# Patient Record
Sex: FEMALE | Race: BLACK OR AFRICAN AMERICAN | NOT HISPANIC OR LATINO | Employment: FULL TIME | URBAN - METROPOLITAN AREA
[De-identification: names, ages, dates, MRNs, and addresses within clinical notes are randomized per-mention and may not be internally consistent; named-entity substitution may affect disease eponyms.]

---

## 2017-06-05 LAB — POC BETA-HCG (QUAL): NEGATIVE

## 2017-06-06 ENCOUNTER — HISTORICAL (OUTPATIENT)
Dept: LAB | Facility: HOSPITAL | Age: 27
End: 2017-06-06

## 2017-08-31 LAB — POC BETA-HCG (QUAL): NEGATIVE

## 2017-11-30 LAB — POC BETA-HCG (QUAL): NEGATIVE

## 2018-02-15 ENCOUNTER — HISTORICAL (OUTPATIENT)
Dept: LAB | Facility: HOSPITAL | Age: 28
End: 2018-02-15

## 2018-02-15 LAB — POC BETA-HCG (QUAL): NEGATIVE

## 2018-04-19 ENCOUNTER — HISTORICAL (OUTPATIENT)
Dept: ADMINISTRATIVE | Facility: HOSPITAL | Age: 28
End: 2018-04-19

## 2018-04-19 LAB
ABS NEUT (OLG): 3.26 X10(3)/MCL (ref 2.1–9.2)
ALBUMIN SERPL-MCNC: 4.2 GM/DL (ref 3.4–5)
ALBUMIN/GLOB SERPL: 1 RATIO (ref 1–2)
ALP SERPL-CCNC: 56 UNIT/L (ref 45–117)
ALT SERPL-CCNC: 26 UNIT/L (ref 12–78)
AST SERPL-CCNC: 14 UNIT/L (ref 15–37)
BASOPHILS # BLD AUTO: 0.03 X10(3)/MCL
BASOPHILS NFR BLD AUTO: 0 %
BILIRUB SERPL-MCNC: 0.6 MG/DL (ref 0.2–1)
BILIRUBIN DIRECT+TOT PNL SERPL-MCNC: 0.2 MG/DL
BILIRUBIN DIRECT+TOT PNL SERPL-MCNC: 0.4 MG/DL
BUN SERPL-MCNC: 13 MG/DL (ref 7–18)
CALCIUM SERPL-MCNC: 8.7 MG/DL (ref 8.5–10.1)
CHLORIDE SERPL-SCNC: 108 MMOL/L (ref 98–107)
CO2 SERPL-SCNC: 27 MMOL/L (ref 21–32)
CREAT SERPL-MCNC: 0.7 MG/DL (ref 0.6–1.3)
DEPRECATED CALCIDIOL+CALCIFEROL SERPL-MC: 11.62 NG/ML (ref 30–80)
EOSINOPHIL # BLD AUTO: 0.02 10*3/UL
EOSINOPHIL NFR BLD AUTO: 0 %
ERYTHROCYTE [DISTWIDTH] IN BLOOD BY AUTOMATED COUNT: 13 % (ref 11.5–14.5)
GLOBULIN SER-MCNC: 3.9 GM/ML (ref 2.3–3.5)
GLUCOSE SERPL-MCNC: 82 MG/DL (ref 74–106)
HCT VFR BLD AUTO: 40.9 % (ref 35–46)
HGB BLD-MCNC: 13.3 GM/DL (ref 12–16)
IMM GRANULOCYTES # BLD AUTO: 0.01 10*3/UL
IMM GRANULOCYTES NFR BLD AUTO: 0 %
LYMPHOCYTES # BLD AUTO: 1.91 X10(3)/MCL
LYMPHOCYTES NFR BLD AUTO: 33 % (ref 13–40)
MCH RBC QN AUTO: 28.3 PG (ref 26–34)
MCHC RBC AUTO-ENTMCNC: 32.5 GM/DL (ref 31–37)
MCV RBC AUTO: 87 FL (ref 80–100)
MONOCYTES # BLD AUTO: 0.5 X10(3)/MCL
MONOCYTES NFR BLD AUTO: 9 % (ref 4–12)
NEUTROPHILS # BLD AUTO: 3.26 X10(3)/MCL
NEUTROPHILS NFR BLD AUTO: 57 X10(3)/MCL
PLATELET # BLD AUTO: 268 X10(3)/MCL (ref 130–400)
PMV BLD AUTO: 9.2 FL (ref 7.4–10.4)
POTASSIUM SERPL-SCNC: 3.5 MMOL/L (ref 3.5–5.1)
PROT SERPL-MCNC: 8.1 GM/DL (ref 6.4–8.2)
RBC # BLD AUTO: 4.7 X10(6)/MCL (ref 4–5.2)
SODIUM SERPL-SCNC: 139 MMOL/L (ref 136–145)
TSH SERPL-ACNC: 1.28 MIU/L (ref 0.36–3.74)
WBC # SPEC AUTO: 5.7 X10(3)/MCL (ref 4.5–11)

## 2018-05-10 LAB — POC BETA-HCG (QUAL): NEGATIVE

## 2018-08-14 LAB — POC BETA-HCG (QUAL): NEGATIVE

## 2018-11-08 LAB — POC BETA-HCG (QUAL): NEGATIVE

## 2019-02-07 LAB — POC BETA-HCG (QUAL): NEGATIVE

## 2019-03-21 ENCOUNTER — HISTORICAL (OUTPATIENT)
Dept: ADMINISTRATIVE | Facility: HOSPITAL | Age: 29
End: 2019-03-21

## 2019-03-21 LAB — DEPRECATED CALCIDIOL+CALCIFEROL SERPL-MC: 16.18 NG/ML (ref 30–80)

## 2019-05-09 LAB — POC BETA-HCG (QUAL): NEGATIVE

## 2019-12-06 ENCOUNTER — HISTORICAL (OUTPATIENT)
Dept: ADMINISTRATIVE | Facility: HOSPITAL | Age: 29
End: 2019-12-06

## 2019-12-09 LAB — FINAL CULTURE: NORMAL

## 2020-03-19 LAB — POC BETA-HCG (QUAL): NEGATIVE

## 2020-03-20 ENCOUNTER — HISTORICAL (OUTPATIENT)
Dept: LAB | Facility: HOSPITAL | Age: 30
End: 2020-03-20

## 2020-05-18 ENCOUNTER — HISTORICAL (OUTPATIENT)
Dept: ADMINISTRATIVE | Facility: HOSPITAL | Age: 30
End: 2020-05-18

## 2020-05-18 LAB
ABS NEUT (OLG): 4.25 X10(3)/MCL (ref 2.1–9.2)
ALBUMIN SERPL-MCNC: 3.8 GM/DL (ref 3.4–5)
ALBUMIN/GLOB SERPL: 1 RATIO (ref 1.1–2)
ALP SERPL-CCNC: 55 UNIT/L (ref 45–117)
ALT SERPL-CCNC: 22 UNIT/L (ref 12–78)
AST SERPL-CCNC: 14 UNIT/L (ref 15–37)
BASOPHILS # BLD AUTO: 0 X10(3)/MCL (ref 0–0.2)
BASOPHILS NFR BLD AUTO: 0 %
BILIRUB SERPL-MCNC: 0.3 MG/DL (ref 0.2–1)
BILIRUBIN DIRECT+TOT PNL SERPL-MCNC: 0.1 MG/DL (ref 0–0.2)
BILIRUBIN DIRECT+TOT PNL SERPL-MCNC: 0.2 MG/DL
BUN SERPL-MCNC: 13 MG/DL (ref 7–18)
CALCIUM SERPL-MCNC: 8.8 MG/DL (ref 8.5–10.1)
CHLORIDE SERPL-SCNC: 107 MMOL/L (ref 98–107)
CO2 SERPL-SCNC: 28 MMOL/L (ref 21–32)
CREAT SERPL-MCNC: 0.7 MG/DL (ref 0.6–1.3)
DEPRECATED CALCIDIOL+CALCIFEROL SERPL-MC: 21.1 NG/ML (ref 30–80)
EOSINOPHIL # BLD AUTO: 0 X10(3)/MCL (ref 0–0.9)
EOSINOPHIL NFR BLD AUTO: 0 %
ERYTHROCYTE [DISTWIDTH] IN BLOOD BY AUTOMATED COUNT: 12.4 % (ref 11.5–14.5)
GLOBULIN SER-MCNC: 4 GM/ML (ref 2.3–3.5)
GLUCOSE SERPL-MCNC: 96 MG/DL (ref 74–106)
HCT VFR BLD AUTO: 40.5 % (ref 35–46)
HGB BLD-MCNC: 12.9 GM/DL (ref 12–16)
IMM GRANULOCYTES # BLD AUTO: 0.02 10*3/UL
IMM GRANULOCYTES NFR BLD AUTO: 0 %
LYMPHOCYTES # BLD AUTO: 1.6 X10(3)/MCL (ref 0.6–4.6)
LYMPHOCYTES NFR BLD AUTO: 25 %
MCH RBC QN AUTO: 27.7 PG (ref 26–34)
MCHC RBC AUTO-ENTMCNC: 31.9 GM/DL (ref 31–37)
MCV RBC AUTO: 86.9 FL (ref 80–100)
MONOCYTES # BLD AUTO: 0.5 X10(3)/MCL (ref 0.1–1.3)
MONOCYTES NFR BLD AUTO: 8 %
NEUTROPHILS # BLD AUTO: 4.25 X10(3)/MCL (ref 2.1–9.2)
NEUTROPHILS NFR BLD AUTO: 66 %
PLATELET # BLD AUTO: 236 X10(3)/MCL (ref 130–400)
PMV BLD AUTO: 9.4 FL (ref 7.4–10.4)
POTASSIUM SERPL-SCNC: 3.6 MMOL/L (ref 3.5–5.1)
PROT SERPL-MCNC: 7.8 GM/DL (ref 6.4–8.2)
RBC # BLD AUTO: 4.66 X10(6)/MCL (ref 4–5.2)
SODIUM SERPL-SCNC: 139 MMOL/L (ref 136–145)
TSH SERPL-ACNC: 1.18 MIU/L (ref 0.36–3.74)
WBC # SPEC AUTO: 6.4 X10(3)/MCL (ref 4.5–11)

## 2020-06-14 ENCOUNTER — HISTORICAL (OUTPATIENT)
Dept: ADMINISTRATIVE | Facility: HOSPITAL | Age: 30
End: 2020-06-14

## 2020-06-14 LAB — POC BETA-HCG (QUAL): NEGATIVE

## 2020-10-28 ENCOUNTER — HISTORICAL (OUTPATIENT)
Dept: LAB | Facility: HOSPITAL | Age: 30
End: 2020-10-28

## 2020-10-28 LAB
ABS NEUT (OLG): 3.41 X10(3)/MCL (ref 2.1–9.2)
B-HCG FREE SERPL-ACNC: 187 MIU/ML
BASOPHILS # BLD AUTO: 0 X10(3)/MCL (ref 0–0.2)
BASOPHILS NFR BLD AUTO: 0 %
EOSINOPHIL # BLD AUTO: 0 X10(3)/MCL (ref 0–0.9)
EOSINOPHIL NFR BLD AUTO: 0 %
ERYTHROCYTE [DISTWIDTH] IN BLOOD BY AUTOMATED COUNT: 12.2 % (ref 11.5–17)
HCT VFR BLD AUTO: 41 % (ref 37–47)
HGB BLD-MCNC: 12.7 GM/DL (ref 12–16)
IMM GRANULOCYTES # BLD AUTO: 0.01 % (ref 0–0.02)
IMM GRANULOCYTES NFR BLD AUTO: 0.2 % (ref 0–0.43)
LYMPHOCYTES # BLD AUTO: 1.8 X10(3)/MCL (ref 0.6–4.6)
LYMPHOCYTES NFR BLD AUTO: 31 %
MCH RBC QN AUTO: 27.5 PG (ref 27–31)
MCHC RBC AUTO-ENTMCNC: 31 GM/DL (ref 33–36)
MCV RBC AUTO: 88.9 FL (ref 80–94)
MONOCYTES # BLD AUTO: 0.5 X10(3)/MCL (ref 0.1–1.3)
MONOCYTES NFR BLD AUTO: 8 %
NEUTROPHILS # BLD AUTO: 3.41 X10(3)/MCL (ref 1.4–7.9)
NEUTROPHILS NFR BLD AUTO: 60 %
PLATELET # BLD AUTO: 247 X10(3)/MCL (ref 130–400)
PMV BLD AUTO: 9.1 FL (ref 9.4–12.4)
POC BETA-HCG (QUAL): POSITIVE
RBC # BLD AUTO: 4.61 X10(6)/MCL (ref 4.2–5.4)
WBC # SPEC AUTO: 5.7 X10(3)/MCL (ref 4.5–11.5)

## 2020-10-30 ENCOUNTER — HISTORICAL (OUTPATIENT)
Dept: ADMINISTRATIVE | Facility: HOSPITAL | Age: 30
End: 2020-10-30

## 2020-10-30 LAB — B-HCG FREE SERPL-ACNC: 172.59 MIU/ML

## 2020-11-04 LAB — POC BETA-HCG (QUAL): POSITIVE

## 2020-11-10 LAB — POC BETA-HCG (QUAL): NEGATIVE

## 2021-03-11 ENCOUNTER — HISTORICAL (OUTPATIENT)
Dept: ADMINISTRATIVE | Facility: HOSPITAL | Age: 31
End: 2021-03-11

## 2021-03-11 LAB — DEPRECATED CALCIDIOL+CALCIFEROL SERPL-MC: 40.4 NG/ML (ref 30–80)

## 2021-04-21 LAB
HUMAN PAPILLOMAVIRUS (HPV): NORMAL
PAP RECOMMENDATION EXT: NORMAL
PAP SMEAR: NORMAL
POC BETA-HCG (QUAL): NEGATIVE

## 2021-05-13 ENCOUNTER — HISTORICAL (OUTPATIENT)
Dept: RADIOLOGY | Facility: HOSPITAL | Age: 31
End: 2021-05-13

## 2021-07-16 LAB — POC BETA-HCG (QUAL): NEGATIVE

## 2021-10-14 LAB — POC BETA-HCG (QUAL): NEGATIVE

## 2021-12-28 ENCOUNTER — HISTORICAL (OUTPATIENT)
Dept: ADMINISTRATIVE | Facility: HOSPITAL | Age: 31
End: 2021-12-28

## 2021-12-28 LAB — SARS-COV-2 RNA RESP QL NAA+PROBE: NEGATIVE

## 2022-01-05 ENCOUNTER — HISTORICAL (OUTPATIENT)
Dept: RADIOLOGY | Facility: HOSPITAL | Age: 32
End: 2022-01-05

## 2022-01-14 LAB
BILIRUB SERPL-MCNC: NEGATIVE MG/DL
BLOOD URINE, POC: NEGATIVE
GLUCOSE UR QL STRIP: NEGATIVE
KETONES UR QL STRIP: NEGATIVE
LEUKOCYTE EST, POC UA: NEGATIVE
NITRITE, POC UA: NEGATIVE
PH, POC UA: 5
POC BETA-HCG (QUAL): NEGATIVE
PROTEIN, POC: NEGATIVE
SPECIFIC GRAVITY, POC UA: 1.01
UROBILINOGEN, POC UA: NORMAL

## 2022-04-11 ENCOUNTER — HISTORICAL (OUTPATIENT)
Dept: ADMINISTRATIVE | Facility: HOSPITAL | Age: 32
End: 2022-04-11
Payer: MEDICAID

## 2022-04-26 VITALS
BODY MASS INDEX: 25.61 KG/M2 | WEIGHT: 150 LBS | DIASTOLIC BLOOD PRESSURE: 67 MMHG | SYSTOLIC BLOOD PRESSURE: 110 MMHG | OXYGEN SATURATION: 100 % | HEIGHT: 64 IN

## 2022-08-01 ENCOUNTER — HOSPITAL ENCOUNTER (OUTPATIENT)
Dept: RADIOLOGY | Facility: HOSPITAL | Age: 32
Discharge: HOME | End: 2022-08-01
Attending: OBSTETRICS & GYNECOLOGY
Payer: MEDICAID

## 2022-08-01 DIAGNOSIS — R92.8 ABNORMAL MAMMOGRAM: ICD-10-CM

## 2022-08-01 PROCEDURE — 77066 MAMMO DIGITAL DIAGNOSTIC BILAT WITH TOMO: ICD-10-PCS | Mod: 26,,, | Performed by: RADIOLOGY

## 2022-08-01 PROCEDURE — 77066 DX MAMMO INCL CAD BI: CPT | Mod: TC

## 2022-08-01 PROCEDURE — 76642 ULTRASOUND BREAST LIMITED: CPT | Mod: 26,LT,, | Performed by: RADIOLOGY

## 2022-08-01 PROCEDURE — 77062 MAMMO DIGITAL DIAGNOSTIC BILAT WITH TOMO: ICD-10-PCS | Mod: 26,,, | Performed by: RADIOLOGY

## 2022-08-01 PROCEDURE — 77066 DX MAMMO INCL CAD BI: CPT | Mod: 26,,, | Performed by: RADIOLOGY

## 2022-08-01 PROCEDURE — 76642 US BREAST LEFT LIMITED: ICD-10-PCS | Mod: 26,LT,, | Performed by: RADIOLOGY

## 2022-08-01 PROCEDURE — 77062 BREAST TOMOSYNTHESIS BI: CPT | Mod: 26,,, | Performed by: RADIOLOGY

## 2022-08-01 PROCEDURE — 76642 ULTRASOUND BREAST LIMITED: CPT | Mod: TC,LT

## 2022-08-11 ENCOUNTER — OFFICE VISIT (OUTPATIENT)
Dept: FAMILY MEDICINE | Facility: CLINIC | Age: 32
End: 2022-08-11
Payer: MEDICAID

## 2022-08-11 VITALS
OXYGEN SATURATION: 100 % | WEIGHT: 150.19 LBS | HEIGHT: 65 IN | RESPIRATION RATE: 18 BRPM | DIASTOLIC BLOOD PRESSURE: 73 MMHG | HEART RATE: 82 BPM | TEMPERATURE: 98 F | SYSTOLIC BLOOD PRESSURE: 113 MMHG | BODY MASS INDEX: 25.02 KG/M2

## 2022-08-11 DIAGNOSIS — E55.9 VITAMIN D DEFICIENCY: ICD-10-CM

## 2022-08-11 DIAGNOSIS — J30.2 SEASONAL ALLERGIES: ICD-10-CM

## 2022-08-11 DIAGNOSIS — R60.9 TRACE EDEMA: Primary | ICD-10-CM

## 2022-08-11 PROCEDURE — 99214 OFFICE O/P EST MOD 30 MIN: CPT | Mod: PBBFAC | Performed by: FAMILY MEDICINE

## 2022-08-11 RX ORDER — LORATADINE 10 MG/1
10 TABLET ORAL DAILY
Qty: 30 TABLET | Refills: 11 | Status: SHIPPED | OUTPATIENT
Start: 2022-08-11 | End: 2023-04-20 | Stop reason: SDUPTHER

## 2022-08-11 RX ORDER — LORATADINE 10 MG/1
10 TABLET ORAL DAILY
COMMUNITY
Start: 2021-12-28 | End: 2022-08-11 | Stop reason: SDUPTHER

## 2022-08-11 NOTE — PROGRESS NOTES
Subjective:       Patient ID: Alina Owens is a 32 y.o. female.    Chief Complaint: Pt left ankle swelling    HPI     Reports left ankle/LE swelling that is continued. Some swelling on the right but not as much as left.  Swelling is worse at the end of the day. No SOB. Able to do activities with any symptoms. Able to lie flat without SOB. No pain in ankle. Reports LE edema in family members also.     Seasonal allergies- taking medication prn which works for her      Review of Systems   Constitutional: Negative for activity change and unexpected weight change.   HENT: Negative for hearing loss, rhinorrhea and trouble swallowing.    Eyes: Negative for discharge and visual disturbance.   Respiratory: Negative for chest tightness and wheezing.    Cardiovascular: Negative for chest pain and palpitations.   Gastrointestinal: Negative for blood in stool, constipation, diarrhea and vomiting.   Endocrine: Negative for polydipsia and polyuria.   Genitourinary: Negative for difficulty urinating, dysuria, hematuria and menstrual problem.   Musculoskeletal: Positive for joint swelling. Negative for arthralgias and neck pain.   Neurological: Negative for weakness and headaches.   Psychiatric/Behavioral: Negative for confusion and dysphoric mood.            Objective:      Vitals:    08/11/22 0846   BP: 113/73   Pulse: 82   Resp: 18   Temp: 98.2 °F (36.8 °C)       Physical Exam    General: pleasant, well developed, in no acute distress  Head: normocephalic, atraumatic  Skin: warm and dry  Heart: regular rate and rhythm, S1S2 normal, no murmurs, rubs, or gallops  Lungs: clear to auscultation bilaterally, no wheezes  Abdomen: bowel sounds present, soft, nontender  Extremities: trace edema in LE  Neurological: nonfocal, alert and oriented, cooperative with exam  Psych: judgement and insight good, though process logical, goal directed      Assessment/Plan:  Trace edema  -     CBC Auto Differential; Future; Expected date:  08/25/2022  -     Comprehensive Metabolic Panel; Future; Expected date: 08/25/2022  -     Vitamin D; Future; Expected date: 08/25/2022  -     TSH; Future; Expected date: 08/25/2022  - increase water, limit salt, compression socks    Seasonal allergies  - continue Claritin    Vitamin D deficiency  -     Vitamin D; Future; Expected date: 08/25/2022  - Vit D supplements    Other orders  -     loratadine (CLARITIN) 10 mg tablet; Take 1 tablet (10 mg total) by mouth once daily at 6am.  Dispense: 30 tablet; Refill: 11       Follow up in about 6 months (around 2/11/2023).

## 2022-08-11 NOTE — LETTER
August 11, 2022      [facility] [address] [place] [address]  Phone: [phone]       Patient: Alina Owens   YOB: 1990  Date of Visit: 08/11/2022    To Whom It May Concern:    Marilu Owens  was at [facility] on 08/11/2022. The patient may return to school on 08/11/2022 without restrictions. If you have any questions or concerns, or if I can be of further assistance, please do not hesitate to contact me.    Sincerely,    Lori Velazquez LPN

## 2022-09-22 ENCOUNTER — HISTORICAL (OUTPATIENT)
Dept: ADMINISTRATIVE | Facility: HOSPITAL | Age: 32
End: 2022-09-22
Payer: MEDICAID

## 2022-10-05 RX ORDER — LORATADINE 10 MG/1
10 TABLET ORAL
Qty: 30 TABLET | Refills: 11 | Status: CANCELLED | OUTPATIENT
Start: 2022-10-05

## 2022-10-06 ENCOUNTER — PATIENT MESSAGE (OUTPATIENT)
Dept: FAMILY MEDICINE | Facility: CLINIC | Age: 32
End: 2022-10-06
Payer: MEDICAID

## 2022-10-06 NOTE — TELEPHONE ENCOUNTER
Pt requested refills on Claritin through patient portal, last sent on 8/11/2022 with 11 refills. Rx proposal canceled and [facility]

## 2022-12-15 ENCOUNTER — DOCUMENTATION ONLY (OUTPATIENT)
Dept: FAMILY MEDICINE | Facility: CLINIC | Age: 32
End: 2022-12-15
Payer: MEDICAID

## 2022-12-16 ENCOUNTER — OFFICE VISIT (OUTPATIENT)
Dept: FAMILY MEDICINE | Facility: CLINIC | Age: 32
End: 2022-12-16
Payer: MEDICAID

## 2022-12-16 VITALS
HEART RATE: 81 BPM | SYSTOLIC BLOOD PRESSURE: 103 MMHG | DIASTOLIC BLOOD PRESSURE: 72 MMHG | BODY MASS INDEX: 26.12 KG/M2 | TEMPERATURE: 99 F | WEIGHT: 153 LBS | HEIGHT: 64 IN | RESPIRATION RATE: 18 BRPM | OXYGEN SATURATION: 99 %

## 2022-12-16 DIAGNOSIS — N63.23 MASS OF LOWER OUTER QUADRANT OF LEFT BREAST: ICD-10-CM

## 2022-12-16 DIAGNOSIS — N64.4 BREAST PAIN: Primary | ICD-10-CM

## 2022-12-16 PROCEDURE — 99215 OFFICE O/P EST HI 40 MIN: CPT | Mod: PBBFAC | Performed by: FAMILY MEDICINE

## 2022-12-16 NOTE — PROGRESS NOTES
Subjective:       Patient ID: Alina Owens is a 32 y.o. female.    Chief Complaint: Left breast pain    HPI    33 yo with complaint of left breast pain. Only tender to the touch on the left lateral side. No injury to the breast. No change in bra or activity. She has had similar breast pain in the past and has been getting imaging. Last imaging in Aug 2022. She does report increased caffeine intake and that she has also noticed similar symptoms when she is at the end of her depo cycle (next shot due in 3 weeks). No nipple discharge. No pain in right breast    Review of Systems   Constitutional:  Negative for activity change and unexpected weight change.   HENT:  Negative for hearing loss, rhinorrhea and trouble swallowing.    Eyes:  Negative for discharge and visual disturbance.   Respiratory:  Negative for chest tightness and wheezing.    Cardiovascular:  Negative for chest pain and palpitations.   Gastrointestinal:  Negative for blood in stool, constipation, diarrhea and vomiting.   Endocrine: Negative for polydipsia and polyuria.   Genitourinary:  Negative for difficulty urinating, dysuria, hematuria and menstrual problem.   Musculoskeletal:  Negative for arthralgias, joint swelling and neck pain.   Neurological:  Negative for weakness and headaches.   Psychiatric/Behavioral:  Negative for confusion and dysphoric mood.             Objective:      Vitals:    12/16/22 0840   BP: 103/72   Pulse: 81   Resp: 18   Temp: 98.6 °F (37 °C)       Physical Exam    General: pleasant, well developed, in no acute distress  Head: normocephalic, atraumatic  Breasts: no masses palpated, no skin changes, no nipple discharge  Neurological: nonfocal, alert and oriented, cooperative with exam  Psych: judgement and insight good, though process logical, goal directed      Assessment/Plan:  Breast pain  -     Mammo Digital Diagnostic Bilat with Joe; Future; Expected date: 12/23/2022  -      Breast Left Complete; Future;  Expected date: 12/23/2022    Mass of lower outer quadrant of left breast  -     Mammo Digital Diagnostic Bilat with Joe; Future; Expected date: 12/23/2022  -     US Breast Left Complete; Future; Expected date: 12/23/2022    - discussed options including decreasing caffeine and getting next shot to monitor for resolution of symptoms vs further imaging. Pt would prefer to have further imaging now. Last imaging in Aug 2022. Orders placed      Follow up in about 2 months (around 2/16/2023).

## 2023-01-02 ENCOUNTER — OFFICE VISIT (OUTPATIENT)
Dept: URGENT CARE | Facility: CLINIC | Age: 33
End: 2023-01-02
Payer: MEDICAID

## 2023-01-02 VITALS
WEIGHT: 150.5 LBS | DIASTOLIC BLOOD PRESSURE: 82 MMHG | HEART RATE: 85 BPM | SYSTOLIC BLOOD PRESSURE: 116 MMHG | HEIGHT: 66 IN | TEMPERATURE: 99 F | RESPIRATION RATE: 16 BRPM | OXYGEN SATURATION: 99 % | BODY MASS INDEX: 24.19 KG/M2

## 2023-01-02 DIAGNOSIS — J01.90 ACUTE SINUSITIS, RECURRENCE NOT SPECIFIED, UNSPECIFIED LOCATION: Primary | ICD-10-CM

## 2023-01-02 DIAGNOSIS — R05.9 COUGH, UNSPECIFIED TYPE: ICD-10-CM

## 2023-01-02 DIAGNOSIS — J02.9 SORE THROAT: ICD-10-CM

## 2023-01-02 DIAGNOSIS — Z11.52 ENCOUNTER FOR SCREENING FOR COVID-19: ICD-10-CM

## 2023-01-02 LAB
CTP QC/QA: YES
SARS-COV-2 RDRP RESP QL NAA+PROBE: NEGATIVE

## 2023-01-02 PROCEDURE — 99213 PR OFFICE/OUTPT VISIT, EST, LEVL III, 20-29 MIN: ICD-10-PCS | Mod: S$PBB,,, | Performed by: NURSE PRACTITIONER

## 2023-01-02 PROCEDURE — 99213 OFFICE O/P EST LOW 20 MIN: CPT | Mod: S$PBB,,, | Performed by: NURSE PRACTITIONER

## 2023-01-02 PROCEDURE — 87081 CULTURE SCREEN ONLY: CPT | Performed by: NURSE PRACTITIONER

## 2023-01-02 PROCEDURE — 87635 SARS-COV-2 COVID-19 AMP PRB: CPT | Mod: PBBFAC | Performed by: NURSE PRACTITIONER

## 2023-01-02 PROCEDURE — 99213 OFFICE O/P EST LOW 20 MIN: CPT | Mod: PBBFAC | Performed by: NURSE PRACTITIONER

## 2023-01-02 RX ORDER — AMOXICILLIN 875 MG/1
875 TABLET, FILM COATED ORAL 2 TIMES DAILY
Qty: 20 TABLET | Refills: 0 | Status: SHIPPED | OUTPATIENT
Start: 2023-01-02 | End: 2023-01-12

## 2023-01-02 RX ORDER — LEVOCETIRIZINE DIHYDROCHLORIDE 5 MG/1
5 TABLET, FILM COATED ORAL NIGHTLY
Qty: 14 TABLET | Refills: 0 | Status: SHIPPED | OUTPATIENT
Start: 2023-01-02 | End: 2023-03-14

## 2023-01-02 RX ORDER — PROMETHAZINE HYDROCHLORIDE AND DEXTROMETHORPHAN HYDROBROMIDE 6.25; 15 MG/5ML; MG/5ML
5 SYRUP ORAL EVERY 6 HOURS PRN
Qty: 100 ML | Refills: 0 | Status: SHIPPED | OUTPATIENT
Start: 2023-01-02 | End: 2023-03-14

## 2023-01-02 NOTE — PROGRESS NOTES
"Subjective:       Patient ID: Alina Owens is a 32 y.o. female.    Vitals:  height is 5' 5.75" (1.67 m) and weight is 68.3 kg (150 lb 8 oz). Her oral temperature is 98.9 °F (37.2 °C). Her blood pressure is 116/82 and her pulse is 85. Her respiration is 16 and oxygen saturation is 99%.     Chief Complaint: Cough (Throat - Entered by patient), Chest Pain (With cough), and Hoarse    CC as above. Symptoms x 1 week. Taking otc cold medication.       Constitution: Negative.   HENT:  Positive for ear pain and congestion.    Neck: neck negative.   Cardiovascular: Negative.    Respiratory:  Positive for cough.      Objective:      Physical Exam   Constitutional: She is oriented to person, place, and time. She appears well-developed.   HENT:   Head: Normocephalic.   Ears:   Right Ear: Tympanic membrane normal.   Left Ear: Tympanic membrane normal.   Nose: Congestion present. Right sinus exhibits frontal sinus tenderness. Left sinus exhibits frontal sinus tenderness.   Mouth/Throat: Oropharynx is clear.   Eyes: Conjunctivae and EOM are normal. Pupils are equal, round, and reactive to light.   Neck: Neck supple.   Cardiovascular: Normal rate, regular rhythm and normal heart sounds.   Pulmonary/Chest: Effort normal and breath sounds normal.   Musculoskeletal: Normal range of motion.         General: Normal range of motion.   Neurological: She is alert and oriented to person, place, and time.   Skin: Skin is warm and dry.   Psychiatric: Her behavior is normal.   Vitals reviewed.      Assessment:       1. Acute sinusitis, recurrence not specified, unspecified location    2. Encounter for screening for COVID-19    3. Sore throat    4. Cough, unspecified type              Office Visit on 01/02/2023   Component Date Value Ref Range Status    POC Rapid COVID 01/02/2023 Negative  Negative Final     Acceptable 01/02/2023 Yes   Final        No results found.   Plan:         Medication as ordered. May use " humidifier.  If any shortness of breath, wheezing, continued fevers or any new symptoms then immediately go to ER.      Acute sinusitis, recurrence not specified, unspecified location  -     amoxicillin (AMOXIL) 875 MG tablet; Take 1 tablet (875 mg total) by mouth 2 (two) times daily. for 10 days  Dispense: 20 tablet; Refill: 0  -     levocetirizine (XYZAL) 5 MG tablet; Take 1 tablet (5 mg total) by mouth every evening. for 14 days  Dispense: 14 tablet; Refill: 0    Encounter for screening for COVID-19  -     POCT COVID-19 Rapid Screening    Sore throat  -     Strep Only Culture    Cough, unspecified type  -     promethazine-dextromethorphan (PROMETHAZINE-DM) 6.25-15 mg/5 mL Syrp; Take 5 mLs by mouth every 6 (six) hours as needed (cough).  Dispense: 100 mL; Refill: 0

## 2023-01-05 LAB — BACTERIA THROAT CULT: NORMAL

## 2023-03-14 ENCOUNTER — OFFICE VISIT (OUTPATIENT)
Dept: URGENT CARE | Facility: CLINIC | Age: 33
End: 2023-03-14
Payer: MEDICAID

## 2023-03-14 VITALS
DIASTOLIC BLOOD PRESSURE: 81 MMHG | SYSTOLIC BLOOD PRESSURE: 120 MMHG | RESPIRATION RATE: 18 BRPM | OXYGEN SATURATION: 100 % | TEMPERATURE: 99 F | HEIGHT: 65 IN | BODY MASS INDEX: 24.89 KG/M2 | WEIGHT: 149.38 LBS | HEART RATE: 74 BPM

## 2023-03-14 DIAGNOSIS — R68.89 FLU-LIKE SYMPTOMS: ICD-10-CM

## 2023-03-14 DIAGNOSIS — Z11.52 ENCOUNTER FOR SCREENING FOR SEVERE ACUTE RESPIRATORY SYNDROME CORONAVIRUS 2 (SARS-COV-2) INFECTION: Primary | ICD-10-CM

## 2023-03-14 LAB
CTP QC/QA: YES
FLUAV AG UPPER RESP QL IA.RAPID: NOT DETECTED
FLUBV AG UPPER RESP QL IA.RAPID: NOT DETECTED
SARS-COV-2 RDRP RESP QL NAA+PROBE: NEGATIVE

## 2023-03-14 PROCEDURE — 99214 OFFICE O/P EST MOD 30 MIN: CPT | Mod: S$PBB,,, | Performed by: NURSE PRACTITIONER

## 2023-03-14 PROCEDURE — 87502 INFLUENZA DNA AMP PROBE: CPT | Performed by: NURSE PRACTITIONER

## 2023-03-14 PROCEDURE — 99214 OFFICE O/P EST MOD 30 MIN: CPT | Mod: PBBFAC | Performed by: NURSE PRACTITIONER

## 2023-03-14 PROCEDURE — 87635 SARS-COV-2 COVID-19 AMP PRB: CPT | Mod: PBBFAC | Performed by: NURSE PRACTITIONER

## 2023-03-14 PROCEDURE — 99214 PR OFFICE/OUTPT VISIT, EST, LEVL IV, 30-39 MIN: ICD-10-PCS | Mod: S$PBB,,, | Performed by: NURSE PRACTITIONER

## 2023-03-14 RX ORDER — PREDNISONE 10 MG/1
TABLET ORAL
Qty: 10 TABLET | Refills: 0 | Status: SHIPPED | OUTPATIENT
Start: 2023-03-14 | End: 2023-04-20

## 2023-03-14 RX ORDER — PROMETHAZINE HYDROCHLORIDE AND DEXTROMETHORPHAN HYDROBROMIDE 6.25; 15 MG/5ML; MG/5ML
5 SYRUP ORAL EVERY 6 HOURS PRN
Qty: 100 ML | Refills: 0 | Status: SHIPPED | OUTPATIENT
Start: 2023-03-14 | End: 2023-03-19

## 2023-03-14 RX ORDER — BENZONATATE 200 MG/1
200 CAPSULE ORAL 3 TIMES DAILY PRN
Qty: 30 CAPSULE | Refills: 0 | Status: SHIPPED | OUTPATIENT
Start: 2023-03-14 | End: 2023-04-20

## 2023-03-14 RX ORDER — AZITHROMYCIN 250 MG/1
TABLET, FILM COATED ORAL
Qty: 6 TABLET | Refills: 0 | Status: SHIPPED | OUTPATIENT
Start: 2023-03-14 | End: 2023-03-19

## 2023-03-14 NOTE — PATIENT INSTRUCTIONS
Bronchitis cough can last 4-6 weeks.  If you develop fevers, upper back pain, sweats, chills, body aches, fatigue, confusion, green/bloody/brown mucous, please go to ER or return to this clinic for a chest xray.  Drink plenty of water.   Do not take complete bedrest.   Take Rx Medications as directed on labels.  Okay to use cough drops, humidifier, gali's rub.

## 2023-03-14 NOTE — PROGRESS NOTES
"Subjective:       Patient ID: Alina Owens is a 32 y.o. female.    Vitals:  height is 5' 5" (1.651 m) and weight is 67.8 kg (149 lb 6.4 oz). Her oral temperature is 98.9 °F (37.2 °C). Her blood pressure is 120/81 and her pulse is 74. Her respiration is 18 and oxygen saturation is 100%.     Chief Complaint: Cough (Entered by patient. With chest tightness. States bothers her at night. Productive cough at times. xAbout one week), Nasal Congestion (Post nasal drip. xAbout one week), and Shortness of Breath (xAbout one week)    HPI as stated in CC however clarification of SOB symptoms: chest congestion, "cold" sensation in chest when coughing. Non smoker. No asthma. No fevers. Nighttime cough worse/Cough fits at night.   ROS    Objective:      Physical Exam   Constitutional: She is oriented to person, place, and time. She does not appear ill. normal  HENT:   Nose: No rhinorrhea or congestion.   Eyes: Conjunctivae are normal.   Cardiovascular: Normal rate, regular rhythm and normal heart sounds.   Pulmonary/Chest: Effort normal and breath sounds normal.   Abdominal: Normal appearance.   Musculoskeletal: Normal range of motion.         General: Normal range of motion.   Lymphadenopathy:     She has no cervical adenopathy.   Neurological: She is alert and oriented to person, place, and time.   Skin: Skin is warm and dry.   Psychiatric: Her behavior is normal. Mood, judgment and thought content normal.   Vitals reviewed.      Assessment:       1. Encounter for screening for severe acute respiratory syndrome coronavirus 2 (SARS-CoV-2) infection    2. Flu-like symptoms          Results for orders placed or performed in visit on 03/14/23   POCT COVID-19 Rapid Screening   Result Value Ref Range    POC Rapid COVID Negative Negative     Acceptable Yes          Plan:         Encounter for screening for severe acute respiratory syndrome coronavirus 2 (SARS-CoV-2) infection  -     POCT COVID-19 Rapid " Screening    Flu-like symptoms  -     FLU A & B PCR       Bronchitis cough can last 4-6 weeks.  If you develop fevers, upper back pain, sweats, chills, body aches, fatigue, confusion, green/bloody/brown mucous, please go to ER or return to this clinic for a chest xray.  Drink plenty of water.   Do not take complete bedrest.   Take Rx Medications as directed on labels.  Okay to use cough drops, humidifier, gali's rub.

## 2023-04-20 ENCOUNTER — OFFICE VISIT (OUTPATIENT)
Dept: FAMILY MEDICINE | Facility: CLINIC | Age: 33
End: 2023-04-20
Payer: MEDICAID

## 2023-04-20 VITALS
TEMPERATURE: 98 F | RESPIRATION RATE: 18 BRPM | HEART RATE: 92 BPM | BODY MASS INDEX: 25.13 KG/M2 | SYSTOLIC BLOOD PRESSURE: 112 MMHG | OXYGEN SATURATION: 99 % | DIASTOLIC BLOOD PRESSURE: 73 MMHG | HEIGHT: 65 IN | WEIGHT: 150.81 LBS

## 2023-04-20 DIAGNOSIS — E55.9 VITAMIN D DEFICIENCY: ICD-10-CM

## 2023-04-20 DIAGNOSIS — J30.2 SEASONAL ALLERGIES: ICD-10-CM

## 2023-04-20 DIAGNOSIS — Z00.00 WELLNESS EXAMINATION: Primary | ICD-10-CM

## 2023-04-20 DIAGNOSIS — M25.512 ACUTE PAIN OF LEFT SHOULDER: ICD-10-CM

## 2023-04-20 LAB
ALBUMIN SERPL-MCNC: 4.3 G/DL (ref 3.5–5)
ALBUMIN/GLOB SERPL: 1.3 RATIO (ref 1.1–2)
ALP SERPL-CCNC: 52 UNIT/L (ref 40–150)
ALT SERPL-CCNC: 18 UNIT/L (ref 0–55)
AST SERPL-CCNC: 18 UNIT/L (ref 5–34)
BASOPHILS # BLD AUTO: 0.03 X10(3)/MCL (ref 0–0.2)
BASOPHILS NFR BLD AUTO: 0.5 %
BILIRUBIN DIRECT+TOT PNL SERPL-MCNC: 0.4 MG/DL
BUN SERPL-MCNC: 12.9 MG/DL (ref 7–18.7)
CALCIUM SERPL-MCNC: 9.2 MG/DL (ref 8.4–10.2)
CHLORIDE SERPL-SCNC: 110 MMOL/L (ref 98–107)
CHOLEST SERPL-MCNC: 149 MG/DL
CHOLEST/HDLC SERPL: 3 {RATIO} (ref 0–5)
CO2 SERPL-SCNC: 24 MMOL/L (ref 22–29)
CREAT SERPL-MCNC: 0.77 MG/DL (ref 0.55–1.02)
DEPRECATED CALCIDIOL+CALCIFEROL SERPL-MC: 23.7 NG/ML (ref 30–80)
EOSINOPHIL # BLD AUTO: 0.04 X10(3)/MCL (ref 0–0.9)
EOSINOPHIL NFR BLD AUTO: 0.7 %
ERYTHROCYTE [DISTWIDTH] IN BLOOD BY AUTOMATED COUNT: 12.7 % (ref 11.5–17)
GFR SERPLBLD CREATININE-BSD FMLA CKD-EPI: >60 MLS/MIN/1.73/M2
GLOBULIN SER-MCNC: 3.4 GM/DL (ref 2.4–3.5)
GLUCOSE SERPL-MCNC: 89 MG/DL (ref 74–100)
HCT VFR BLD AUTO: 39.8 % (ref 37–47)
HCV AB SERPL QL IA: NONREACTIVE
HDLC SERPL-MCNC: 59 MG/DL (ref 35–60)
HGB BLD-MCNC: 13.1 G/DL (ref 12–16)
HIV 1+2 AB+HIV1 P24 AG SERPL QL IA: NONREACTIVE
IMM GRANULOCYTES # BLD AUTO: 0.03 X10(3)/MCL (ref 0–0.04)
IMM GRANULOCYTES NFR BLD AUTO: 0.5 %
LDLC SERPL CALC-MCNC: 84 MG/DL (ref 50–140)
LYMPHOCYTES # BLD AUTO: 1.59 X10(3)/MCL (ref 0.6–4.6)
LYMPHOCYTES NFR BLD AUTO: 27.4 %
MCH RBC QN AUTO: 28 PG (ref 27–31)
MCHC RBC AUTO-ENTMCNC: 32.9 G/DL (ref 33–36)
MCV RBC AUTO: 85 FL (ref 80–94)
MONOCYTES # BLD AUTO: 0.52 X10(3)/MCL (ref 0.1–1.3)
MONOCYTES NFR BLD AUTO: 9 %
NEUTROPHILS # BLD AUTO: 3.6 X10(3)/MCL (ref 2.1–9.2)
NEUTROPHILS NFR BLD AUTO: 61.9 %
NRBC BLD AUTO-RTO: 0 %
PLATELET # BLD AUTO: 269 X10(3)/MCL (ref 130–400)
PMV BLD AUTO: 9.8 FL (ref 7.4–10.4)
POTASSIUM SERPL-SCNC: 3.6 MMOL/L (ref 3.5–5.1)
PROT SERPL-MCNC: 7.7 GM/DL (ref 6.4–8.3)
RBC # BLD AUTO: 4.68 X10(6)/MCL (ref 4.2–5.4)
SODIUM SERPL-SCNC: 141 MMOL/L (ref 136–145)
TRIGL SERPL-MCNC: 31 MG/DL (ref 37–140)
TSH SERPL-ACNC: 0.96 UIU/ML (ref 0.35–4.94)
VLDLC SERPL CALC-MCNC: 6 MG/DL
WBC # SPEC AUTO: 5.8 X10(3)/MCL (ref 4.5–11.5)

## 2023-04-20 PROCEDURE — 86803 HEPATITIS C AB TEST: CPT | Performed by: FAMILY MEDICINE

## 2023-04-20 PROCEDURE — 87389 HIV-1 AG W/HIV-1&-2 AB AG IA: CPT | Performed by: FAMILY MEDICINE

## 2023-04-20 PROCEDURE — 80053 COMPREHEN METABOLIC PANEL: CPT | Performed by: FAMILY MEDICINE

## 2023-04-20 PROCEDURE — 36415 COLL VENOUS BLD VENIPUNCTURE: CPT | Performed by: FAMILY MEDICINE

## 2023-04-20 PROCEDURE — 85025 COMPLETE CBC W/AUTO DIFF WBC: CPT | Performed by: FAMILY MEDICINE

## 2023-04-20 PROCEDURE — 82306 VITAMIN D 25 HYDROXY: CPT | Performed by: FAMILY MEDICINE

## 2023-04-20 PROCEDURE — 80061 LIPID PANEL: CPT | Performed by: FAMILY MEDICINE

## 2023-04-20 PROCEDURE — 99214 OFFICE O/P EST MOD 30 MIN: CPT | Mod: PBBFAC | Performed by: FAMILY MEDICINE

## 2023-04-20 PROCEDURE — 84443 ASSAY THYROID STIM HORMONE: CPT | Performed by: FAMILY MEDICINE

## 2023-04-20 RX ORDER — NAPROXEN 500 MG/1
500 TABLET ORAL 2 TIMES DAILY
Qty: 30 TABLET | Refills: 0 | Status: SHIPPED | OUTPATIENT
Start: 2023-04-20

## 2023-04-20 RX ORDER — LORATADINE 10 MG/1
10 TABLET ORAL DAILY
Qty: 30 TABLET | Refills: 11 | Status: SHIPPED | OUTPATIENT
Start: 2023-04-20 | End: 2024-01-05 | Stop reason: SDUPTHER

## 2023-04-20 NOTE — PROGRESS NOTES
Subjective:       Patient ID: Alina Owens is a 33 y.o. female.    Chief Complaint: Routine clinic visit., Shoulder Pain, and Shortness of Breath (Pt stated since last URI noticed at night the SOB happens at time.)    HPI  34 yo for follow up    Breast pain at last visit - imaging ordered but not completed. Symptoms resolved after depo shot however.     She did have URI with symptoms mainly resolved. Does have a cough at night that she feels like a tickle in her throat. Not on claritin at this time.     Left shoulder pain that started 2 weeks ago. Hurts more when laying down or certain activities. No specific injury but does carry heavy loads at time. Has not tried any OTC meds or stretches/exercises.     Follow up mammogram due in Aug 2023    Review of Systems  As per HPI         Objective:      Vitals:    04/20/23 1039   BP: 112/73   Pulse: 92   Resp: 18   Temp: 98.4 °F (36.9 °C)       Physical Exam    General: pleasant, well developed, in no acute distress  Head: normocephalic, atraumatic  ENT: mild bogginess in nasal turbinates  Skin: warm and dry  Heart: regular rate and rhythm, S1S2 normal, no murmurs, rubs, or gallops  Lungs: clear to auscultation bilaterally, no wheezes  MSK: mild tenderness to lateral aspect of left shoulder; full ROM bilaterally and equal; neg Neers, [place], and empty can  Neurological: nonfocal, alert and oriented, cooperative with exam  Psych: judgement and insight good, though process logical, goal directed    Assessment/Plan:  Wellness examination  -     TSH; Future; Expected date: 04/20/2023  -     Vitamin D; Future; Expected date: 04/20/2023  -     CBC Auto Differential; Future; Expected date: 04/20/2023  -     Comprehensive Metabolic Panel; Future; Expected date: 04/20/2023  -     HIV 1/2 Ag/Ab (4th Gen); Future; Expected date: 04/20/2023  -     Hepatitis C Antibody; Future; Expected date: 04/20/2023  -     Lipid Panel; Future; Expected date: 04/20/2023    Vitamin D  deficiency  - check level and supplement as needed    Seasonal allergies  -     loratadine (CLARITIN) 10 mg tablet; Take 1 tablet (10 mg total) by mouth once daily.  Dispense: 30 tablet; Refill: 11    Acute pain of left shoulder  -     naproxen (NAPROSYN) 500 MG tablet; Take 1 tablet (500 mg total) by mouth 2 (two) times daily.  Dispense: 30 tablet; Refill: 0  - stretching exercises given  - if pain continues, will further work up with imaging/PT       Follow up in about 1 month (around 5/20/2023).

## 2023-05-04 ENCOUNTER — OFFICE VISIT (OUTPATIENT)
Dept: URGENT CARE | Facility: CLINIC | Age: 33
End: 2023-05-04
Payer: MEDICAID

## 2023-05-04 VITALS
DIASTOLIC BLOOD PRESSURE: 78 MMHG | HEART RATE: 76 BPM | BODY MASS INDEX: 25.47 KG/M2 | RESPIRATION RATE: 18 BRPM | TEMPERATURE: 98 F | WEIGHT: 152.88 LBS | SYSTOLIC BLOOD PRESSURE: 117 MMHG | HEIGHT: 65 IN | OXYGEN SATURATION: 100 %

## 2023-05-04 DIAGNOSIS — Z11.52 ENCOUNTER FOR SCREENING FOR SEVERE ACUTE RESPIRATORY SYNDROME CORONAVIRUS 2 (SARS-COV-2) INFECTION: ICD-10-CM

## 2023-05-04 DIAGNOSIS — R05.9 COUGH, UNSPECIFIED TYPE: ICD-10-CM

## 2023-05-04 DIAGNOSIS — R09.81 NASAL CONGESTION: Primary | ICD-10-CM

## 2023-05-04 LAB
FLUAV AG UPPER RESP QL IA.RAPID: NOT DETECTED
FLUBV AG UPPER RESP QL IA.RAPID: NOT DETECTED
RSV A 5' UTR RNA NPH QL NAA+PROBE: NOT DETECTED
SARS-COV-2 RNA RESP QL NAA+PROBE: NOT DETECTED

## 2023-05-04 PROCEDURE — 99214 OFFICE O/P EST MOD 30 MIN: CPT | Mod: PBBFAC

## 2023-05-04 PROCEDURE — 0241U COVID/RSV/FLU A&B PCR: CPT

## 2023-05-04 PROCEDURE — 99213 OFFICE O/P EST LOW 20 MIN: CPT | Mod: S$PBB,,,

## 2023-05-04 PROCEDURE — 99213 PR OFFICE/OUTPT VISIT, EST, LEVL III, 20-29 MIN: ICD-10-PCS | Mod: S$PBB,,,

## 2023-05-04 RX ORDER — LORATADINE 10 MG/1
10 TABLET ORAL DAILY
Qty: 30 TABLET | Refills: 0 | Status: SHIPPED | OUTPATIENT
Start: 2023-05-04 | End: 2023-06-03

## 2023-05-04 RX ORDER — FLUTICASONE PROPIONATE 50 MCG
2 SPRAY, SUSPENSION (ML) NASAL DAILY
Qty: 18.2 ML | Refills: 0 | Status: SHIPPED | OUTPATIENT
Start: 2023-05-04

## 2023-05-04 NOTE — PROGRESS NOTES
"Subjective:      Patient ID: Alina Owens is a 33 y.o. female.    Vitals:  height is 5' 5" (1.651 m) and weight is 69.4 kg (152 lb 14.4 oz). Her oral temperature is 97.9 °F (36.6 °C). Her blood pressure is 117/78 and her pulse is 76. Her respiration is 18 and oxygen saturation is 100%.     Chief Complaint: Cough (xSunday) and Nasal Congestion (xSunday)    PT presents with  cough and nasal congestion since Monday. Children with same symptoms.      Cough      Constitution: Negative.   HENT:  Positive for congestion.    Neck: neck negative.   Cardiovascular: Negative.    Eyes: Negative.    Respiratory:  Positive for cough.    Gastrointestinal: Negative.    Genitourinary: Negative.    Musculoskeletal: Negative.    Skin: Negative.    Neurological: Negative.     Objective:     Physical Exam   Constitutional: She is oriented to person, place, and time. normal  HENT:   Head: Normocephalic.   Ears:   Right Ear: Tympanic membrane, external ear and ear canal normal.   Left Ear: Tympanic membrane, external ear and ear canal normal.   Nose: Congestion present.   Mouth/Throat: Uvula is midline, oropharynx is clear and moist and mucous membranes are normal. Mucous membranes are moist. Oropharynx is clear.   Eyes: Pupils are equal, round, and reactive to light.   Cardiovascular: Normal rate, regular rhythm, normal heart sounds and normal pulses.   Pulmonary/Chest: Effort normal and breath sounds normal.   Abdominal: Normal appearance. Soft.   Musculoskeletal: Normal range of motion.         General: Normal range of motion.   Neurological: She is alert and oriented to person, place, and time.   Skin: Skin is warm and dry.   Vitals reviewed.    Assessment:     1. Nasal congestion    2. Encounter for screening for severe acute respiratory syndrome coronavirus 2 (SARS-CoV-2) infection    3. Cough, unspecified type        Plan:       Nasal congestion  -     fluticasone propionate (FLONASE) 50 mcg/actuation nasal spray; 2 " sprays (100 mcg total) by Each Nostril route once daily.  Dispense: 18.2 mL; Refill: 0  -     loratadine (CLARITIN) 10 mg tablet; Take 1 tablet (10 mg total) by mouth once daily.  Dispense: 30 tablet; Refill: 0  -     COVID/RSV/FLU A&B PCR    Encounter for screening for severe acute respiratory syndrome coronavirus 2 (SARS-CoV-2) infection  -     COVID/RSV/FLU A&B PCR    Cough, unspecified type  -     COVID/RSV/FLU A&B PCR      Please drink plenty of fluids.  Please get plenty of rest.    Take over the counter Tylenol (Acetaminophen) and/or Motrin (Ibuprofen) as directed for control of pain and/or fever.  Please follow up with your primary care doctor.     ER precautions given, patient verbalized understanding.     Please see provided patient education for guidance.    Follow up with PCP or return to clinic if symptoms worsen or do not improve.

## 2023-05-25 ENCOUNTER — HOSPITAL ENCOUNTER (OUTPATIENT)
Dept: RADIOLOGY | Facility: HOSPITAL | Age: 33
Discharge: HOME | End: 2023-05-25
Attending: FAMILY MEDICINE
Payer: MEDICAID

## 2023-05-25 ENCOUNTER — OFFICE VISIT (OUTPATIENT)
Dept: FAMILY MEDICINE | Facility: CLINIC | Age: 33
End: 2023-05-25
Payer: MEDICAID

## 2023-05-25 VITALS
DIASTOLIC BLOOD PRESSURE: 77 MMHG | BODY MASS INDEX: 25.13 KG/M2 | HEIGHT: 65 IN | RESPIRATION RATE: 18 BRPM | OXYGEN SATURATION: 98 % | SYSTOLIC BLOOD PRESSURE: 118 MMHG | HEART RATE: 81 BPM | WEIGHT: 150.81 LBS | TEMPERATURE: 99 F

## 2023-05-25 DIAGNOSIS — J06.9 UPPER RESPIRATORY TRACT INFECTION, UNSPECIFIED TYPE: ICD-10-CM

## 2023-05-25 DIAGNOSIS — M25.512 LEFT SHOULDER PAIN, UNSPECIFIED CHRONICITY: ICD-10-CM

## 2023-05-25 DIAGNOSIS — M25.512 LEFT SHOULDER PAIN, UNSPECIFIED CHRONICITY: Primary | ICD-10-CM

## 2023-05-25 PROCEDURE — 99215 OFFICE O/P EST HI 40 MIN: CPT | Mod: PBBFAC | Performed by: FAMILY MEDICINE

## 2023-05-25 PROCEDURE — 73030 X-RAY EXAM OF SHOULDER: CPT | Mod: TC,LT

## 2023-05-25 NOTE — PROGRESS NOTES
Subjective:       Patient ID: Alina Owens is a 33 y.o. female.    Chief Complaint: Follow-up, Shoulder Pain (Left shoulder pain), and Sinus Problem    HPI    34 yo for follow up    Last month, pt had shoulder pain that had started 2 weeks prior. Pain still continuing. NSAIDs/stretching given at that time. Worse with lying down (not necessarily. No acute injuries.     Feeling sinus congestion/pressure. Occasional coughing. No sneezing. No itchy or watery eyes. No fever. Took OTC medication yesterday.     Follow up mammogram due in Aug 2023    Review of Systems  As per HPI         Objective:      Vitals:    05/25/23 1129   BP: 118/77   Pulse: 81   Resp: 18   Temp: 98.6 °F (37 °C)       Physical Exam    General: pleasant, well developed, in no acute distress  Head: normocephalic, atraumatic  ENT: mild bogginess in nasal turbinates; TM clear bilaterally; minimal drainage in posterior oropharynx  Skin: warm and dry  MSK: mild tenderness to lateral aspect of left shoulder; full ROM bilaterally and equal  Neurological: nonfocal, alert and oriented, cooperative with exam  Psych: judgement and insight good, though process logical, goal directed      Assessment/Plan:  Left shoulder pain, unspecified chronicity  -     X-Ray Shoulder 2 or More Views Left; Future; Expected date: 05/25/2023  -     Ambulatory referral/consult to Physical/Occupational Therapy; Future; Expected date: 06/01/2023  - NSAIDS prn  - may need further imaging/eval if pain persists    Upper respiratory tract infection, unspecified type  - OTC treatments discussed  - return precautions       Follow up in about 2 months (around 7/25/2023).

## 2023-05-30 ENCOUNTER — PATIENT MESSAGE (OUTPATIENT)
Dept: FAMILY MEDICINE | Facility: CLINIC | Age: 33
End: 2023-05-30
Payer: MEDICAID

## 2023-05-30 ENCOUNTER — TELEPHONE (OUTPATIENT)
Dept: FAMILY MEDICINE | Facility: CLINIC | Age: 33
End: 2023-05-30
Payer: MEDICAID

## 2023-05-31 RX ORDER — PROMETHAZINE HYDROCHLORIDE AND DEXTROMETHORPHAN HYDROBROMIDE 6.25; 15 MG/5ML; MG/5ML
5 SYRUP ORAL EVERY 6 HOURS PRN
Qty: 60 ML | Refills: 0 | Status: SHIPPED | OUTPATIENT
Start: 2023-05-31 | End: 2023-06-10

## 2023-05-31 NOTE — TELEPHONE ENCOUNTER
Rx sent to pharmacy. If symptoms continue or get worse, recommend in office evaluation or urgent care   negative - no nasal congestion

## 2023-07-28 ENCOUNTER — OFFICE VISIT (OUTPATIENT)
Dept: FAMILY MEDICINE | Facility: CLINIC | Age: 33
End: 2023-07-28
Payer: MEDICAID

## 2023-07-28 VITALS
SYSTOLIC BLOOD PRESSURE: 121 MMHG | HEART RATE: 61 BPM | DIASTOLIC BLOOD PRESSURE: 78 MMHG | BODY MASS INDEX: 24.36 KG/M2 | WEIGHT: 146.19 LBS | TEMPERATURE: 98 F | RESPIRATION RATE: 18 BRPM | HEIGHT: 65 IN

## 2023-07-28 DIAGNOSIS — L50.9 URTICARIA: Primary | ICD-10-CM

## 2023-07-28 DIAGNOSIS — M25.512 LEFT SHOULDER PAIN, UNSPECIFIED CHRONICITY: ICD-10-CM

## 2023-07-28 PROCEDURE — 99214 OFFICE O/P EST MOD 30 MIN: CPT | Mod: PBBFAC | Performed by: FAMILY MEDICINE

## 2023-07-28 NOTE — PROGRESS NOTES
Subjective:       Patient ID: Alina Owens is a 33 y.o. female.    Chief Complaint: Weight Loss and Discuss possible allergic reaction to seafood    HPI    32 yo for follow up    Patient concerned about her weight and would like to gain weight. States that she feels like she is too thin.     Also, patient reports intermittent hives/itching over the past few months. She has had this happen with seafood in the past and admits that she has been eating more seafood/shellfish lately. It improves with benadryl and is resolved by the time she wakes up in the AM. She would like to have allergy testing. No current hives.    She is in PT for her shoulder currently. Still having shoulder pain.     Followed by OBGYN also. Follow up mammogram due in Aug 2023- already ordered.      Review of Systems  As per HPI         Objective:      Vitals:    07/28/23 1055   BP: 121/78   Pulse: 61   Resp: 18   Temp: 97.9 °F (36.6 °C)       Physical Exam    General: pleasant, well developed, in no acute distress  Head: normocephalic, atraumatic  Skin: warm and dry  Heart: regular rate and rhythm, S1S2 normal, no murmurs, rubs, or gallops  Lungs: clear to auscultation bilaterally, no wheezes  Neurological: nonfocal, alert and oriented, cooperative with exam  Psych: judgement and insight good, though process logical, goal directed    Assessment/Plan:  Urticaria  -     Ambulatory referral/consult to Allergy; Future; Expected date: 08/04/2023  - continue daily claritin  -  benadryl or vistrail prn  - avoid NSAIDs  - advised to avoid seafood/shellfish if this appears to be a recurrent trend   - ER precautions  - will refer to allergy    Left shoulder pain, unspecified chronicity  - continue PT  - if symptoms persist after PT, may need further imaging and/or referral to ortho/sports med      Discussed that patient is actually a healthy weight and that further weight gain would put her in an overweight BMI and increase health risks. Advised  against any medication for weight gain. Weight is actually increased over the past few years upon chart review to 2020.     Follow up in about 6 months (around 1/28/2024).

## 2023-08-24 ENCOUNTER — OFFICE VISIT (OUTPATIENT)
Dept: URGENT CARE | Facility: CLINIC | Age: 33
End: 2023-08-24
Payer: MEDICAID

## 2023-08-24 VITALS
DIASTOLIC BLOOD PRESSURE: 76 MMHG | TEMPERATURE: 100 F | SYSTOLIC BLOOD PRESSURE: 114 MMHG | RESPIRATION RATE: 18 BRPM | BODY MASS INDEX: 25.78 KG/M2 | OXYGEN SATURATION: 99 % | HEART RATE: 113 BPM | WEIGHT: 151 LBS | HEIGHT: 64 IN

## 2023-08-24 DIAGNOSIS — U07.1 COVID: Primary | ICD-10-CM

## 2023-08-24 DIAGNOSIS — R09.89 SYMPTOMS OF UPPER RESPIRATORY INFECTION (URI): ICD-10-CM

## 2023-08-24 DIAGNOSIS — R05.1 ACUTE COUGH: ICD-10-CM

## 2023-08-24 LAB
CTP QC/QA: YES
SARS-COV-2 RDRP RESP QL NAA+PROBE: POSITIVE

## 2023-08-24 PROCEDURE — 99213 PR OFFICE/OUTPT VISIT, EST, LEVL III, 20-29 MIN: ICD-10-PCS | Mod: S$PBB,,, | Performed by: NURSE PRACTITIONER

## 2023-08-24 PROCEDURE — 87635 SARS-COV-2 COVID-19 AMP PRB: CPT | Mod: PBBFAC | Performed by: NURSE PRACTITIONER

## 2023-08-24 PROCEDURE — 99213 OFFICE O/P EST LOW 20 MIN: CPT | Mod: S$PBB,,, | Performed by: NURSE PRACTITIONER

## 2023-08-24 PROCEDURE — 99214 OFFICE O/P EST MOD 30 MIN: CPT | Mod: PBBFAC | Performed by: NURSE PRACTITIONER

## 2023-08-24 RX ORDER — GUAIFENESIN/DEXTROMETHORPHAN 100-10MG/5
10 SYRUP ORAL EVERY 6 HOURS
Qty: 118 ML | Refills: 0 | Status: SHIPPED | OUTPATIENT
Start: 2023-08-24

## 2023-08-24 RX ORDER — ALBUTEROL SULFATE 90 UG/1
2 AEROSOL, METERED RESPIRATORY (INHALATION) EVERY 6 HOURS PRN
Qty: 1 G | Refills: 2 | Status: SHIPPED | OUTPATIENT
Start: 2023-08-24

## 2023-08-24 NOTE — LETTER
August 24, 2023      [facility]  [address] [place] [id number]  Phone: [phone]       Patient: Alina Owens   YOB: 1990  Date of Visit: 08/24/2023    To Whom It May Concern:    Marilu Owens  was at [facility] on 08/24/2023. The patient may return to work/school on 8/29/2023 with no restrictions. If you have any questions or concerns, or if I can be of further assistance, please do not hesitate to contact me.    Sincerely,    MIRTA Pizano

## 2023-08-25 NOTE — PATIENT INSTRUCTIONS
Please see provided patient education for guidance.    - OTC cold/flu products as desired for symptoms  - Plenty of fluids  -Humidified air  - Tylenol or Motrin for pain/fever  Covid +  I recommend a 7 day quarantine from day of symptom onset.   COVID is contagious for an average of EIGHT DAYS, starting from Day 1 that you have symptoms.  You can spread COVID 2-3 days before you have symptoms.  The [facility] permits 5 days of quarantine. If you choose to quarantine for 5 days, wear a hospital-grade, surgical mask over your nose and mouth when around other people and in public through day 8.  A cloth mask provides no protection from spreading or mila COVID.    Go to the ER if you experience chest pain with shortness of breath, shortness of breath when moving around your house, high fevers 103.0+, excessive vomiting/diarrhea, or general distress.

## 2023-08-25 NOTE — PROGRESS NOTES
"Subjective:      Patient ID: Alina Owens is a 33 y.o. female.    Vitals:  height is 5' 4" (1.626 m) and weight is 68.5 kg (151 lb). Her oral temperature is 100 °F (37.8 °C). Her blood pressure is 114/76 and her pulse is 113 (abnormal). Her respiration is 18 and oxygen saturation is 99%.     Chief Complaint: Cough (Entered by patient), covid exposure, Nausea, Generalized Body Aches, Fever, Nasal Congestion, and Otalgia    HPI As stated in CC. Has not taken any medication for symptoms today. Close exposure to Covid. Symptoms started this am. Denies chest pain, fever, headache, abdominal pain.    Constitution: Positive for fever.   HENT:  Positive for ear pain.    Respiratory:  Positive for cough.    Gastrointestinal:  Positive for nausea.      Objective:     Physical Exam   Constitutional:  Non-toxic appearance. She does not appear ill. No distress.   HENT:   Head: Normocephalic.   Ears:   Right Ear: External ear normal. Tympanic membrane is not injected.   Left Ear: External ear normal. Tympanic membrane is not injected.   Nose: Rhinorrhea and congestion present. Right sinus exhibits no maxillary sinus tenderness and no frontal sinus tenderness. Left sinus exhibits no maxillary sinus tenderness and no frontal sinus tenderness.   Mouth/Throat: Mucous membranes are moist.   Eyes: Conjunctivae are normal.   Cardiovascular: Normal rate and normal pulses.   Pulmonary/Chest: Effort normal and breath sounds normal.   Abdominal: Normal appearance and bowel sounds are normal. She exhibits no distension. Soft. There is no abdominal tenderness.   Musculoskeletal: Normal range of motion.         General: Normal range of motion.   Lymphadenopathy:     She has no cervical adenopathy.   Neurological: no focal deficit. She is alert.   Skin: Skin is warm, dry and not diaphoretic. Capillary refill takes less than 2 seconds.   Psychiatric: Her behavior is normal. Mood, judgment and thought content normal.   Nursing note and " vitals reviewed.      Assessment:     1. COVID    2. Acute cough    3. Symptoms of upper respiratory infection (URI)      Results for orders placed or performed in visit on 08/24/23   POCT COVID-19 Rapid Screening   Result Value Ref Range    POC Rapid COVID Positive (A) Negative     Acceptable Yes          Plan:   Discussed symptomatic treatment and ear pain.  Please follow instructions on patient education material.  Return to urgent care in 2 to 3 days if symptoms are not improving, immediately if you develop any new or worsening symptoms.   - OTC cold/flu products as desired for symptoms  - Plenty of fluids  -Humidified air  - Tylenol or Motrin for pain/fever  Covid +  Go to the ER if you experience chest pain with shortness of breath, shortness of breath when moving around your house, high fevers 103.0+, excessive vomiting/diarrhea, or general distress.     COVID  -     albuterol (PROVENTIL HFA) 90 mcg/actuation inhaler; Inhale 2 puffs into the lungs every 6 (six) hours as needed for Wheezing. Rescue  Dispense: 1 g; Refill: 2    Acute cough  -     dextromethorphan-guaiFENesin  mg/5 ml (ROBITUSSIN-DM)  mg/5 mL liquid; Take 10 mLs by mouth every 6 (six) hours.  Dispense: 118 mL; Refill: 0    Symptoms of upper respiratory infection (URI)  -     POCT COVID-19 Rapid Screening

## 2023-09-27 ENCOUNTER — HOSPITAL ENCOUNTER (OUTPATIENT)
Dept: RADIOLOGY | Facility: HOSPITAL | Age: 33
Discharge: HOME | End: 2023-09-27
Attending: FAMILY MEDICINE
Payer: MEDICAID

## 2023-09-27 DIAGNOSIS — N63.23 MASS OF LOWER OUTER QUADRANT OF LEFT BREAST: ICD-10-CM

## 2023-09-27 DIAGNOSIS — N64.4 BREAST PAIN: ICD-10-CM

## 2023-09-27 PROCEDURE — 76642 ULTRASOUND BREAST LIMITED: CPT | Mod: 26,50,, | Performed by: RADIOLOGY

## 2023-09-27 PROCEDURE — 77066 DX MAMMO INCL CAD BI: CPT | Mod: TC

## 2023-09-27 PROCEDURE — 76642 US BREAST BILATERAL LIMITED: ICD-10-PCS | Mod: 26,50,, | Performed by: RADIOLOGY

## 2023-09-27 PROCEDURE — 77062 MAMMO DIGITAL DIAGNOSTIC BILAT WITH TOMO: ICD-10-PCS | Mod: 26,,, | Performed by: RADIOLOGY

## 2023-09-27 PROCEDURE — 77066 MAMMO DIGITAL DIAGNOSTIC BILAT WITH TOMO: ICD-10-PCS | Mod: 26,,, | Performed by: RADIOLOGY

## 2023-09-27 PROCEDURE — 76642 ULTRASOUND BREAST LIMITED: CPT | Mod: TC,50

## 2023-09-27 PROCEDURE — 77062 BREAST TOMOSYNTHESIS BI: CPT | Mod: 26,,, | Performed by: RADIOLOGY

## 2023-09-27 PROCEDURE — 77066 DX MAMMO INCL CAD BI: CPT | Mod: 26,,, | Performed by: RADIOLOGY

## 2024-01-05 ENCOUNTER — OFFICE VISIT (OUTPATIENT)
Dept: FAMILY MEDICINE | Facility: CLINIC | Age: 34
End: 2024-01-05
Payer: MEDICAID

## 2024-01-05 VITALS
WEIGHT: 151.81 LBS | BODY MASS INDEX: 25.92 KG/M2 | SYSTOLIC BLOOD PRESSURE: 100 MMHG | RESPIRATION RATE: 18 BRPM | OXYGEN SATURATION: 100 % | DIASTOLIC BLOOD PRESSURE: 67 MMHG | TEMPERATURE: 97 F | HEART RATE: 87 BPM | HEIGHT: 64 IN

## 2024-01-05 DIAGNOSIS — J30.2 SEASONAL ALLERGIES: ICD-10-CM

## 2024-01-05 DIAGNOSIS — Z00.00 WELLNESS EXAMINATION: Primary | ICD-10-CM

## 2024-01-05 DIAGNOSIS — Z23 IMMUNIZATION DUE: ICD-10-CM

## 2024-01-05 PROCEDURE — 90471 IMMUNIZATION ADMIN: CPT | Mod: PBBFAC

## 2024-01-05 PROCEDURE — 90715 TDAP VACCINE 7 YRS/> IM: CPT | Mod: PBBFAC

## 2024-01-05 PROCEDURE — 99213 OFFICE O/P EST LOW 20 MIN: CPT | Mod: PBBFAC | Performed by: FAMILY MEDICINE

## 2024-01-05 RX ORDER — LORATADINE 10 MG/1
10 TABLET ORAL DAILY
Qty: 30 TABLET | Refills: 11 | Status: SHIPPED | OUTPATIENT
Start: 2024-01-05

## 2024-01-05 RX ADMIN — TETANUS TOXOID, REDUCED DIPHTHERIA TOXOID AND ACELLULAR PERTUSSIS VACCINE, ADSORBED 0.5 ML: 5; 2.5; 8; 8; 2.5 SUSPENSION INTRAMUSCULAR at 11:01

## 2024-01-05 NOTE — PROGRESS NOTES
Subjective:       Patient ID: Alina Owens is a 33 y.o. female.    Chief Complaint: Annual Exam, Medication Refill (Loratadine), Throat irritation (X 1 day), and Food insecurity Absent    HPI  34 yo     No fever. Throat irriation this AM  but getting better. Mild nasal congestion. No coughing.    No further urticaria that patient discussed at the last appointment. Also saw allergy.     Seeing Dr Warren for paps     Low Vit D- taking daily supplements    Other than throat irritation, pt has no concerns. Doing well.     Review of Systems  As per HPI         Objective:      Vitals:    01/05/24 1026   BP: 100/67   Pulse: 87   Resp: 18   Temp: 97.4 °F (36.3 °C)       Physical Exam      Assessment/Plan:  Wellness examination  - update Tdap today  - pap with GYN  - healthy diet/exercise    Seasonal allergies  -     loratadine (CLARITIN) 10 mg tablet; Take 1 tablet (10 mg total) by mouth once daily.  Dispense: 30 tablet; Refill: 11    Immunization due  -     Tdap (BOOSTRIX) vaccine injection 0.5 mL    Return precautions discussed for throat irritation. Can use flonase/claritin.      Follow up in about 1 year (around 1/5/2025).

## 2024-05-07 ENCOUNTER — ON-DEMAND VIRTUAL (OUTPATIENT)
Dept: URGENT CARE | Facility: CLINIC | Age: 34
End: 2024-05-07
Payer: MEDICAID

## 2024-05-07 DIAGNOSIS — S90.222A SUBUNGUAL HEMATOMA OF FOOT, LEFT, INITIAL ENCOUNTER: ICD-10-CM

## 2024-05-07 DIAGNOSIS — T14.8XXA HEMATOMA: Primary | ICD-10-CM

## 2024-05-07 PROCEDURE — 99213 OFFICE O/P EST LOW 20 MIN: CPT | Mod: 95,,, | Performed by: NURSE PRACTITIONER

## 2024-05-07 NOTE — PROGRESS NOTES
Subjective:      Patient ID: Alina Owens is a 34 y.o. female.    Vitals:  vitals were not taken for this visit.     Chief Complaint: Nail Problem      Visit Type: TELE AUDIOVISUAL    Present with the patient at the time of consultation: TELEMED PRESENT WITH PATIENT: None        History reviewed. No pertinent past medical history.  Past Surgical History:   Procedure Laterality Date     SECTION       Review of patient's allergies indicates:   Allergen Reactions    Sulfamethoxazole-trimethoprim Hives     Current Outpatient Medications on File Prior to Visit   Medication Sig Dispense Refill    albuterol (PROVENTIL HFA) 90 mcg/actuation inhaler Inhale 2 puffs into the lungs every 6 (six) hours as needed for Wheezing. Rescue 1 g 2    dextromethorphan-guaiFENesin  mg/5 ml (ROBITUSSIN-DM)  mg/5 mL liquid Take 10 mLs by mouth every 6 (six) hours. 118 mL 0    fluticasone propionate (FLONASE) 50 mcg/actuation nasal spray 2 sprays (100 mcg total) by Each Nostril route once daily. (Patient not taking: Reported on 2023) 18.2 mL 0    hydrOXYzine pamoate (VISTARIL) 25 MG Cap Take 25 mg by mouth 3 (three) times daily as needed.      ketorolac (TORADOL) 10 mg tablet Take 10 mg by mouth every 6 (six) hours as needed.      loratadine (CLARITIN) 10 mg tablet Take 1 tablet (10 mg total) by mouth once daily. 30 tablet 11    medroxyPROGESTERone (DEPO-PROVERA) 150 mg/mL injection       medroxyPROGESTERone (DEPO-PROVERA) 150 mg/mL Syrg Inject 1 mL (150 mg total) into the muscle every 3 (three) months. 1 mL 3    naproxen (NAPROSYN) 500 MG tablet Take 1 tablet (500 mg total) by mouth 2 (two) times daily. (Patient not taking: Reported on 2023) 30 tablet 0     No current facility-administered medications on file prior to visit.     Family History   Problem Relation Name Age of Onset    Hypertension Mother      Hyperthyroidism Mother      Stroke Father             Ohs Peq Odvv Intake     5/7/2024 10:23 AM CDT - Filed by Patient   What is your current physical address in the event of a medical emergency? [address] [place], [place].[address]   Are you able to take your vital signs? No   Please attach any relevant images or files          35 yo female c/o left great toe possible nail fungus. She states she was trying to take nail polish off and noticed dark colored nail. She also fell at the ball field and has large bruise to left shin. She does wear gel nail polish. She denies trauma to nail that she is aware of       Constitution: Negative.   HENT: Negative.     Cardiovascular: Negative.    Respiratory: Negative.     Gastrointestinal: Negative.    Endocrine: negative.   Genitourinary: Negative.  Negative for frequency and urgency.   Musculoskeletal: Negative.    Skin: Negative.  Positive for bruising.   Allergic/Immunologic: Negative.    Neurological: Negative.    Hematologic/Lymphatic: Negative.    Psychiatric/Behavioral: Negative.          Objective:   The physical exam was conducted virtually.  LOCATION OF PATIENT home  Physical Exam   Constitutional: She is oriented to person, place, and time. She appears well-developed.   HENT:   Head: Normocephalic and atraumatic.   Ears:   Right Ear: Hearing, tympanic membrane and external ear normal.   Left Ear: Hearing, tympanic membrane and external ear normal.   Nose: Nose normal.   Mouth/Throat: Uvula is midline, oropharynx is clear and moist and mucous membranes are normal.   Eyes: Conjunctivae and EOM are normal. Pupils are equal, round, and reactive to light.   Neck: Neck supple.   Cardiovascular: Normal rate.   Pulmonary/Chest: Effort normal and breath sounds normal.   Musculoskeletal: Normal range of motion.         General: Normal range of motion.      Left foot: Left great toe: Exhibits ecchymosis (subungal hematoma with partial nail lifting from bed).   Neurological: She is alert and oriented to person, place, and time.   Skin: Skin is warm.         Psychiatric: Her behavior is normal. Thought content normal.   Nursing note and vitals reviewed.            Assessment:     1. Hematoma    2. Subungual hematoma of foot, left, initial encounter        Plan:   Apply heat to area of leg.  Follow up with your primary care provider if symptoms persist.  Go to the Emergency room for worsening of symptoms.       Hematoma    Subungual hematoma of foot, left, initial encounter

## 2024-05-10 ENCOUNTER — OFFICE VISIT (OUTPATIENT)
Dept: FAMILY MEDICINE | Facility: CLINIC | Age: 34
End: 2024-05-10
Payer: MEDICAID

## 2024-05-10 VITALS
TEMPERATURE: 98 F | OXYGEN SATURATION: 100 % | RESPIRATION RATE: 18 BRPM | HEART RATE: 82 BPM | DIASTOLIC BLOOD PRESSURE: 72 MMHG | WEIGHT: 152.31 LBS | BODY MASS INDEX: 25.38 KG/M2 | SYSTOLIC BLOOD PRESSURE: 113 MMHG | HEIGHT: 65 IN

## 2024-05-10 DIAGNOSIS — L60.8 DISCOLORED NAILS: ICD-10-CM

## 2024-05-10 DIAGNOSIS — T14.8XXA HEMATOMA: Primary | ICD-10-CM

## 2024-05-10 PROCEDURE — 99213 OFFICE O/P EST LOW 20 MIN: CPT | Mod: PBBFAC | Performed by: FAMILY MEDICINE

## 2024-05-10 NOTE — PROGRESS NOTES
Subjective:       Patient ID: Alina Owens is a 34 y.o. female.    Chief Complaint: left great toe nail discoloration and Fall (Re-evaluate bruise to left shin from  recent fall. )    HPI  35 yo for concern after a fall and also for a discolored great toenail    Pt had a fall while playing last week on baseball field. Had immediate swelling and bruising. She was able to walk immediately after the fall. Swelling has decreased but bruising still present.     Pt also reports left great toenail discoloration that has gotten worse recently and she noticed after removing her nail polish. She does get pedicures. No other nails involved. No pain. No known injury to the toe    Review of Systems  As per HPI         Objective:      Vitals:    05/10/24 1004   BP: 113/72   Pulse: 82   Resp: 18   Temp: 98.4 °F (36.9 °C)       Physical Exam    Gen: alert, no acute distress  Skin: left lower leg- large hematoma present without swelling. Left great toe- discolored distal aspect  Psych: cooperative, appropriate mood and affect      Assessment/Plan:  Hematoma  - reassurance and observation    Discolored nails  - discussed injury to nail vs onychomycosis  - treatment options discussed but patient elects to monitor at this time and notify clinic for any worsening of symptoms       Follow up for January 2025.

## 2025-01-14 ENCOUNTER — ON-DEMAND VIRTUAL (OUTPATIENT)
Dept: URGENT CARE | Facility: CLINIC | Age: 35
End: 2025-01-14
Payer: MEDICAID

## 2025-01-14 DIAGNOSIS — K21.9 GASTROESOPHAGEAL REFLUX DISEASE, UNSPECIFIED WHETHER ESOPHAGITIS PRESENT: Primary | ICD-10-CM

## 2025-01-14 RX ORDER — PANTOPRAZOLE SODIUM 40 MG/1
40 TABLET, DELAYED RELEASE ORAL DAILY
Qty: 30 TABLET | Refills: 0 | Status: SHIPPED | OUTPATIENT
Start: 2025-01-14

## 2025-01-14 RX ORDER — ONDANSETRON 4 MG/1
4 TABLET, ORALLY DISINTEGRATING ORAL EVERY 6 HOURS PRN
Qty: 20 TABLET | Refills: 0 | Status: SHIPPED | OUTPATIENT
Start: 2025-01-14

## 2025-01-14 RX ORDER — SUCRALFATE 1 G/1
1 TABLET ORAL
Qty: 40 TABLET | Refills: 0 | Status: SHIPPED | OUTPATIENT
Start: 2025-01-14

## 2025-01-14 NOTE — PROGRESS NOTES
Subjective:      Patient ID: Alina Owens is a 34 y.o. female.    Vitals:  vitals were not taken for this visit.     Chief Complaint: GI Problem (Worsening acid reflux over the past week.  Taking zofran for nausea.)      Visit Type: TELE AUDIOVISUAL    Present with the patient at the time of consultation: TELEMED PRESENT WITH PATIENT: None    No past medical history on file.  Past Surgical History:   Procedure Laterality Date     SECTION       Review of patient's allergies indicates:   Allergen Reactions    Sulfamethoxazole-trimethoprim Hives     Current Outpatient Medications on File Prior to Visit   Medication Sig Dispense Refill    albuterol (PROVENTIL HFA) 90 mcg/actuation inhaler Inhale 2 puffs into the lungs every 6 (six) hours as needed for Wheezing. Rescue 1 g 2    dextromethorphan-guaiFENesin  mg/5 ml (ROBITUSSIN-DM)  mg/5 mL liquid Take 10 mLs by mouth every 6 (six) hours. 118 mL 0    fluticasone propionate (FLONASE) 50 mcg/actuation nasal spray 2 sprays (100 mcg total) by Each Nostril route once daily. (Patient not taking: Reported on 2023) 18.2 mL 0    hydrOXYzine pamoate (VISTARIL) 25 MG Cap Take 25 mg by mouth 3 (three) times daily as needed.      ketorolac (TORADOL) 10 mg tablet Take 10 mg by mouth every 6 (six) hours as needed.      loratadine (CLARITIN) 10 mg tablet Take 1 tablet (10 mg total) by mouth once daily. 30 tablet 11    medroxyPROGESTERone (DEPO-PROVERA) 150 mg/mL injection       medroxyPROGESTERone (DEPO-PROVERA) 150 mg/mL Syrg Inject 1 mL (150 mg total) into the muscle every 3 (three) months. 1 mL 3    medroxyPROGESTERone (DEPO-PROVERA) 150 mg/mL Syrg Inject 1 mL (150 mg total) into the muscle every 3 (three) months. 1 mL 3    naproxen (NAPROSYN) 500 MG tablet Take 1 tablet (500 mg total) by mouth 2 (two) times daily. (Patient not taking: Reported on 2023) 30 tablet 0     No current facility-administered medications on file prior to visit.      Family History   Problem Relation Name Age of Onset    Hypertension Mother      Hyperthyroidism Mother      Stroke Father         Medications Ordered                [id number] - [place], [place] - [address] [place] [address] AT [facility]   [address] [place], [place] [address] [address]    Telephone: [phone]   Fax: [phone]   Hours: Open 24 hours                         E-Prescribed (3 of 3)              ondansetron (ZOFRAN-ODT) 4 MG TbDL    Sig: Take 1 tablet (4 mg total) by mouth every 6 (six) hours as needed (nausea and vomiting).       Start: 1/14/25     Quantity: 20 tablet Refills: 0                         pantoprazole (PROTONIX) 40 MG tablet    Sig: Take 1 tablet (40 mg total) by mouth once daily.       Start: 1/14/25     Quantity: 30 tablet Refills: 0                         sucralfate (CARAFATE) 1 gram tablet    Sig: Take 1 tablet (1 g total) by mouth 4 (four) times daily before meals and nightly.       Start: 1/14/25     Quantity: 40 tablet Refills: 0                           Ohs Peq Odvv Intake    1/14/2025  3:56 PM CST - Filed by Patient   What is your current physical address in the event of a medical emergency? [address] [place]   Are you able to take your vital signs? No   Please attach any relevant images or files    Is your employer contracted with [facility]? No         HPI     GI Problem     Additional comments: Worsening acid reflux over the past week.  Taking   zofran for nausea.  Recently had influenza.  Feels burning and sometimes like she wants to vomit after eating.  No fever or other symptoms.  Two patient identifiers were used-name was repeated verbally as well as date of birth.  The patient was located in their home in the [facility].          Gastrointestinal:  Positive for nausea and heartburn.        Objective:   The physical exam was conducted virtually.  Physical Exam   Constitutional: She is oriented to person, place, and  time. No distress.   HENT:   Head: Normocephalic and atraumatic.   Neck: Neck supple.   Pulmonary/Chest: Effort normal. No respiratory distress.   Abdominal: Normal appearance.   Musculoskeletal: Normal range of motion.         General: Normal range of motion.   Neurological: no focal deficit. She is alert and oriented to person, place, and time.   Skin: Skin is not pale.   Psychiatric: Her behavior is normal. Mood, judgment and thought content normal.       Assessment:     1. Gastroesophageal reflux disease, unspecified whether esophagitis present        Plan:       Gastroesophageal reflux disease, unspecified whether esophagitis present    Other orders  -     pantoprazole (PROTONIX) 40 MG tablet; Take 1 tablet (40 mg total) by mouth once daily.  Dispense: 30 tablet; Refill: 0  -     sucralfate (CARAFATE) 1 gram tablet; Take 1 tablet (1 g total) by mouth 4 (four) times daily before meals and nightly.  Dispense: 40 tablet; Refill: 0  -     ondansetron (ZOFRAN-ODT) 4 MG TbDL; Take 1 tablet (4 mg total) by mouth every 6 (six) hours as needed (nausea and vomiting).  Dispense: 20 tablet; Refill: 0    Meds as directed.  F/u here if not better with meds, or with your PCP.  May need GI referral.    You must understand that you've received a virtual Care treatment only and that you may be released before all your medical problems are known or treated. You, the patient, will arrange for follow up care as instructed.  If your condition worsens we recommend that you receive another evaluation at an urgent care in person, the emergency room or contact your primary medical clinics after hours call service to discuss your concerns.

## 2025-05-02 ENCOUNTER — OFFICE VISIT (OUTPATIENT)
Dept: FAMILY MEDICINE | Facility: CLINIC | Age: 35
End: 2025-05-02
Payer: MEDICAID

## 2025-05-02 VITALS
HEART RATE: 78 BPM | BODY MASS INDEX: 26.22 KG/M2 | HEIGHT: 65 IN | DIASTOLIC BLOOD PRESSURE: 66 MMHG | SYSTOLIC BLOOD PRESSURE: 100 MMHG | OXYGEN SATURATION: 97 % | WEIGHT: 157.38 LBS | TEMPERATURE: 98 F

## 2025-05-02 DIAGNOSIS — Z00.00 WELLNESS EXAMINATION: Primary | ICD-10-CM

## 2025-05-02 DIAGNOSIS — J30.2 SEASONAL ALLERGIES: ICD-10-CM

## 2025-05-02 LAB
25(OH)D3+25(OH)D2 SERPL-MCNC: 19 NG/ML (ref 30–80)
ALBUMIN SERPL-MCNC: 4.1 G/DL (ref 3.5–5)
ALBUMIN/GLOB SERPL: 1.1 RATIO (ref 1.1–2)
ALP SERPL-CCNC: 57 UNIT/L (ref 40–150)
ALT SERPL-CCNC: 21 UNIT/L (ref 0–55)
ANION GAP SERPL CALC-SCNC: 8 MEQ/L
AST SERPL-CCNC: 16 UNIT/L (ref 11–45)
BASOPHILS # BLD AUTO: 0.02 X10(3)/MCL
BASOPHILS NFR BLD AUTO: 0.3 %
BILIRUB SERPL-MCNC: 0.4 MG/DL
BUN SERPL-MCNC: 15.2 MG/DL (ref 7–18.7)
CALCIUM SERPL-MCNC: 9.2 MG/DL (ref 8.4–10.2)
CHLORIDE SERPL-SCNC: 108 MMOL/L (ref 98–107)
CHOLEST SERPL-MCNC: 156 MG/DL
CHOLEST/HDLC SERPL: 3 {RATIO} (ref 0–5)
CO2 SERPL-SCNC: 24 MMOL/L (ref 22–29)
CREAT SERPL-MCNC: 0.77 MG/DL (ref 0.55–1.02)
CREAT/UREA NIT SERPL: 20
EOSINOPHIL # BLD AUTO: 0.06 X10(3)/MCL (ref 0–0.9)
EOSINOPHIL NFR BLD AUTO: 1 %
ERYTHROCYTE [DISTWIDTH] IN BLOOD BY AUTOMATED COUNT: 12.9 % (ref 11.5–17)
GFR SERPLBLD CREATININE-BSD FMLA CKD-EPI: >60 ML/MIN/1.73/M2
GLOBULIN SER-MCNC: 3.7 GM/DL (ref 2.4–3.5)
GLUCOSE SERPL-MCNC: 76 MG/DL (ref 74–100)
HCT VFR BLD AUTO: 40.6 % (ref 37–47)
HDLC SERPL-MCNC: 57 MG/DL (ref 35–60)
HGB BLD-MCNC: 13.1 G/DL (ref 12–16)
IMM GRANULOCYTES # BLD AUTO: 0.02 X10(3)/MCL (ref 0–0.04)
IMM GRANULOCYTES NFR BLD AUTO: 0.3 %
LDLC SERPL CALC-MCNC: 92 MG/DL (ref 50–140)
LYMPHOCYTES # BLD AUTO: 1.9 X10(3)/MCL (ref 0.6–4.6)
LYMPHOCYTES NFR BLD AUTO: 32.5 %
MCH RBC QN AUTO: 28.3 PG (ref 27–31)
MCHC RBC AUTO-ENTMCNC: 32.3 G/DL (ref 33–36)
MCV RBC AUTO: 87.7 FL (ref 80–94)
MONOCYTES # BLD AUTO: 0.48 X10(3)/MCL (ref 0.1–1.3)
MONOCYTES NFR BLD AUTO: 8.2 %
NEUTROPHILS # BLD AUTO: 3.36 X10(3)/MCL (ref 2.1–9.2)
NEUTROPHILS NFR BLD AUTO: 57.7 %
NRBC BLD AUTO-RTO: 0 %
PLATELET # BLD AUTO: 271 X10(3)/MCL (ref 130–400)
PMV BLD AUTO: 9.8 FL (ref 7.4–10.4)
POTASSIUM SERPL-SCNC: 4 MMOL/L (ref 3.5–5.1)
PROT SERPL-MCNC: 7.8 GM/DL (ref 6.4–8.3)
RBC # BLD AUTO: 4.63 X10(6)/MCL (ref 4.2–5.4)
SODIUM SERPL-SCNC: 140 MMOL/L (ref 136–145)
TRIGL SERPL-MCNC: 34 MG/DL (ref 37–140)
TSH SERPL-ACNC: 1.31 UIU/ML (ref 0.35–4.94)
VLDLC SERPL CALC-MCNC: 7 MG/DL
WBC # BLD AUTO: 5.84 X10(3)/MCL (ref 4.5–11.5)

## 2025-05-02 PROCEDURE — 99213 OFFICE O/P EST LOW 20 MIN: CPT | Mod: PBBFAC | Performed by: FAMILY MEDICINE

## 2025-05-02 PROCEDURE — 84443 ASSAY THYROID STIM HORMONE: CPT | Performed by: FAMILY MEDICINE

## 2025-05-02 PROCEDURE — 82306 VITAMIN D 25 HYDROXY: CPT | Performed by: FAMILY MEDICINE

## 2025-05-02 PROCEDURE — 80053 COMPREHEN METABOLIC PANEL: CPT | Performed by: FAMILY MEDICINE

## 2025-05-02 PROCEDURE — 80061 LIPID PANEL: CPT | Performed by: FAMILY MEDICINE

## 2025-05-02 PROCEDURE — 85025 COMPLETE CBC W/AUTO DIFF WBC: CPT | Performed by: FAMILY MEDICINE

## 2025-05-02 RX ORDER — LORATADINE 10 MG/1
10 TABLET ORAL DAILY
Qty: 30 TABLET | Refills: 11 | Status: SHIPPED | OUTPATIENT
Start: 2025-05-02

## 2025-05-02 NOTE — PROGRESS NOTES
Subjective:       Patient ID: Alina Owens is a 35 y.o. female.    Chief Complaint: Follow-up (WELLNESS EXAM)    Follow-up  Pertinent negatives include no arthralgias, chest pain, headaches, joint swelling, neck pain, vomiting or weakness.     34 yo here for wellness exam    No new health concerns. Pt with 2 boys- 8 and 13 yo.     Seeing Dr Warren. Planning for pap at the next visit. Doing well on depo.     Seasonal allergies- using claritin as needed    Low Vit D- taking multivitamin    Occasional GERD symptoms controlled with as needed meds.     Review of Systems   Constitutional:  Negative for activity change and unexpected weight change.   HENT:  Negative for hearing loss, rhinorrhea and trouble swallowing.    Eyes:  Negative for discharge and visual disturbance.   Respiratory:  Negative for chest tightness and wheezing.    Cardiovascular:  Negative for chest pain and palpitations.   Gastrointestinal:  Negative for blood in stool, constipation, diarrhea and vomiting.   Endocrine: Negative for polydipsia and polyuria.   Genitourinary:  Negative for difficulty urinating, dysuria, hematuria and menstrual problem.   Musculoskeletal:  Negative for arthralgias, joint swelling and neck pain.   Neurological:  Negative for weakness and headaches.   Psychiatric/Behavioral:  Negative for confusion and dysphoric mood.           Objective:      Vitals:    05/02/25 1011   BP: 100/66   Pulse: 78   Temp: 98.2 °F (36.8 °C)       Physical Exam    General: pleasant, well developed, in no acute distress  Head: normocephalic, atraumatic  Skin: warm and dry  Heart: regular rate and rhythm, S1S2 normal, no murmurs, rubs, or gallops  Lungs: clear to auscultation bilaterally, no wheezes  Abdomen: bowel sounds present, soft, nontender  Extremities: no edema  Neurological: nonfocal, alert and oriented, cooperative with exam  Psych: judgement and insight good, though process logical, goal directed    Assessment/Plan:  Wellness  examination  -     CBC Auto Differential; Future; Expected date: 05/02/2025  -     Comprehensive Metabolic Panel; Future; Expected date: 05/02/2025  -     TSH; Future; Expected date: 05/02/2025  -     Vitamin D; Future; Expected date: 05/02/2025  -     Lipid Panel; Future; Expected date: 05/02/2025  - labs today  - healthy diet/exercise    Seasonal allergies  -     loratadine (CLARITIN) 10 mg tablet; Take 1 tablet (10 mg total) by mouth once daily.  Dispense: 30 tablet; Refill: 11         Follow up in about 1 year (around 5/2/2026) for wellness.

## 2025-08-12 ENCOUNTER — OFFICE VISIT (OUTPATIENT)
Dept: URGENT CARE | Facility: CLINIC | Age: 35
End: 2025-08-12
Payer: MEDICAID

## 2025-08-12 ENCOUNTER — HOSPITAL ENCOUNTER (OUTPATIENT)
Dept: RADIOLOGY | Facility: HOSPITAL | Age: 35
Discharge: HOME | End: 2025-08-12
Payer: MEDICAID

## 2025-08-12 VITALS
BODY MASS INDEX: 27.14 KG/M2 | SYSTOLIC BLOOD PRESSURE: 108 MMHG | RESPIRATION RATE: 18 BRPM | HEIGHT: 64 IN | HEART RATE: 80 BPM | WEIGHT: 159 LBS | DIASTOLIC BLOOD PRESSURE: 75 MMHG | TEMPERATURE: 99 F | OXYGEN SATURATION: 95 %

## 2025-08-12 DIAGNOSIS — M25.561 ACUTE PAIN OF RIGHT KNEE: Primary | ICD-10-CM

## 2025-08-12 DIAGNOSIS — M25.561 ACUTE PAIN OF RIGHT KNEE: ICD-10-CM

## 2025-08-12 PROCEDURE — 73562 X-RAY EXAM OF KNEE 3: CPT | Mod: TC,RT

## 2025-08-12 PROCEDURE — 99214 OFFICE O/P EST MOD 30 MIN: CPT | Mod: PBBFAC,25

## 2025-08-12 PROCEDURE — 99214 OFFICE O/P EST MOD 30 MIN: CPT | Mod: S$PBB,,,

## 2025-08-12 RX ORDER — MELOXICAM 7.5 MG/1
7.5 TABLET ORAL DAILY PRN
Qty: 20 TABLET | Refills: 0 | Status: SHIPPED | OUTPATIENT
Start: 2025-08-12